# Patient Record
Sex: FEMALE | Race: BLACK OR AFRICAN AMERICAN | URBAN - METROPOLITAN AREA
[De-identification: names, ages, dates, MRNs, and addresses within clinical notes are randomized per-mention and may not be internally consistent; named-entity substitution may affect disease eponyms.]

---

## 2018-05-14 ENCOUNTER — IP HISTORICAL/CONVERTED ENCOUNTER (OUTPATIENT)
Dept: OTHER | Age: 33
End: 2018-05-14

## 2023-03-27 ENCOUNTER — TELEPHONE (OUTPATIENT)
Dept: OBGYN CLINIC | Age: 38
End: 2023-03-27

## 2023-03-27 NOTE — TELEPHONE ENCOUNTER
Patient called in to schedule an appt for being a new patient---spw patient and scheduled her to see Dr. Katya Mccoy for 04/13/23 at 8:45am---patient's insurance was still pending at the time of scheduling her appt---patient has [employer] [employer] insurance--inputted insurance info--patient is aware of date, time, location, and physician for her appt.